# Patient Record
Sex: MALE | Race: WHITE | NOT HISPANIC OR LATINO | URBAN - METROPOLITAN AREA
[De-identification: names, ages, dates, MRNs, and addresses within clinical notes are randomized per-mention and may not be internally consistent; named-entity substitution may affect disease eponyms.]

---

## 2018-04-18 ENCOUNTER — FOLLOW UP ESTABLISHED (OUTPATIENT)
Dept: URBAN - METROPOLITAN AREA CLINIC 44 | Facility: CLINIC | Age: 68
End: 2018-04-18
Payer: COMMERCIAL

## 2018-04-18 DIAGNOSIS — Z96.1 PRESENCE OF INTRAOCULAR LENS: ICD-10-CM

## 2018-04-18 DIAGNOSIS — H31.012 MACULA SCARS OF POSTERIOR POLE (POST-TRAUMATIC), LEFT EYE: ICD-10-CM

## 2018-04-18 DIAGNOSIS — H25.811 COMBINED FORMS OF AGE-RELATED CATARACT, RIGHT EYE: ICD-10-CM

## 2018-04-18 DIAGNOSIS — Z79.84 LONG TERM (CURRENT) USE OF ORAL ANTIDIABETIC DRUGS: ICD-10-CM

## 2018-04-18 PROCEDURE — 92134 CPTRZ OPH DX IMG PST SGM RTA: CPT | Performed by: OPTOMETRIST

## 2018-04-18 PROCEDURE — 92015 DETERMINE REFRACTIVE STATE: CPT | Performed by: OPTOMETRIST

## 2018-04-18 PROCEDURE — 92014 COMPRE OPH EXAM EST PT 1/>: CPT | Performed by: OPTOMETRIST

## 2018-04-18 ASSESSMENT — VISUAL ACUITY
OD: 20/20
OS: 20/20

## 2018-04-18 ASSESSMENT — INTRAOCULAR PRESSURE
OD: 14
OS: 12

## 2018-04-18 ASSESSMENT — KERATOMETRY
OD: 45.00
OS: 46.25

## 2020-01-28 ENCOUNTER — FOLLOW UP ESTABLISHED (OUTPATIENT)
Dept: URBAN - METROPOLITAN AREA CLINIC 44 | Facility: CLINIC | Age: 70
End: 2020-01-28
Payer: COMMERCIAL

## 2020-01-28 DIAGNOSIS — H04.123 TEAR FILM INSUFFICIENCY OF BILATERAL LACRIMAL GLANDS: Primary | ICD-10-CM

## 2020-01-28 DIAGNOSIS — E11.9 TYPE 2 DIABETES MELLITUS WITHOUT COMPLICATIONS: ICD-10-CM

## 2020-01-28 PROCEDURE — 92025 CPTRIZED CORNEAL TOPOGRAPHY: CPT | Performed by: OPTOMETRIST

## 2020-01-28 PROCEDURE — 92134 CPTRZ OPH DX IMG PST SGM RTA: CPT | Performed by: OPTOMETRIST

## 2020-01-28 PROCEDURE — 92014 COMPRE OPH EXAM EST PT 1/>: CPT | Performed by: OPTOMETRIST

## 2020-01-28 PROCEDURE — 92015 DETERMINE REFRACTIVE STATE: CPT | Performed by: OPTOMETRIST

## 2020-01-28 ASSESSMENT — VISUAL ACUITY
OS: 20/30
OD: 20/20

## 2020-01-28 ASSESSMENT — INTRAOCULAR PRESSURE
OD: 12
OS: 12

## 2020-01-28 ASSESSMENT — KERATOMETRY
OD: 44.75
OS: 46.88

## 2021-04-21 ENCOUNTER — OFFICE VISIT (OUTPATIENT)
Dept: URBAN - METROPOLITAN AREA CLINIC 51 | Facility: CLINIC | Age: 71
End: 2021-04-21
Payer: MEDICARE

## 2021-04-21 DIAGNOSIS — H11.002 UNSPECIFIED PTERYGIUM OF LEFT EYE: ICD-10-CM

## 2021-04-21 DIAGNOSIS — H52.4 PRESBYOPIA: ICD-10-CM

## 2021-04-21 DIAGNOSIS — H11.042 PERIPHERAL PTERYGIUM, STATIONARY, LEFT EYE: ICD-10-CM

## 2021-04-21 PROCEDURE — 92250 FUNDUS PHOTOGRAPHY W/I&R: CPT | Performed by: OPTOMETRIST

## 2021-04-21 PROCEDURE — 92025 CPTRIZED CORNEAL TOPOGRAPHY: CPT | Performed by: OPTOMETRIST

## 2021-04-21 PROCEDURE — 92014 COMPRE OPH EXAM EST PT 1/>: CPT | Performed by: OPTOMETRIST

## 2021-04-21 ASSESSMENT — INTRAOCULAR PRESSURE
OS: 12
OD: 12

## 2021-04-21 ASSESSMENT — VISUAL ACUITY
OD: 20/20
OS: 20/20

## 2021-04-21 ASSESSMENT — KERATOMETRY
OS: 46.83
OD: 44.98

## 2021-04-21 NOTE — IMPRESSION/PLAN
Impression: Combined forms of age-related cataract, right eye Plan: Not visually significant. Monitor. Release new spec rx.

## 2021-04-21 NOTE — IMPRESSION/PLAN
Impression: Unspecified pterygium of left eye: H11.002. Plan: discussed with pt, increase ATS and good UV protections.

## 2021-04-21 NOTE — IMPRESSION/PLAN
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 Plan: continue to monitor.

## 2021-04-21 NOTE — IMPRESSION/PLAN
Impression: Type 2 diabetes mellitus without complications Plan: No diabetic retinopathy. Recommend yearly diabetic eye exam. Discussed with patient importance of good blood sugar control. Send report to PCP.